# Patient Record
Sex: MALE
[De-identification: names, ages, dates, MRNs, and addresses within clinical notes are randomized per-mention and may not be internally consistent; named-entity substitution may affect disease eponyms.]

---

## 2022-09-26 ENCOUNTER — APPOINTMENT (OUTPATIENT)
Dept: UROLOGY | Facility: CLINIC | Age: 77
End: 2022-09-26

## 2022-09-26 VITALS — RESPIRATION RATE: 16 BRPM | TEMPERATURE: 97.6 F | BODY MASS INDEX: 27.92 KG/M2 | HEIGHT: 70 IN | WEIGHT: 195 LBS

## 2022-09-26 DIAGNOSIS — Z00.00 ENCOUNTER FOR GENERAL ADULT MEDICAL EXAMINATION W/OUT ABNORMAL FINDINGS: ICD-10-CM

## 2022-09-26 DIAGNOSIS — N52.9 MALE ERECTILE DYSFUNCTION, UNSPECIFIED: ICD-10-CM

## 2022-09-26 DIAGNOSIS — R39.9 UNSPECIFIED SYMPTOMS AND SIGNS INVOLVING THE GENITOURINARY SYSTEM: ICD-10-CM

## 2022-09-26 LAB
BILIRUB UR QL STRIP: NORMAL
COLLECTION METHOD: NORMAL
GLUCOSE UR-MCNC: NORMAL
HCG UR QL: 0.2 EU/DL
HGB UR QL STRIP.AUTO: NORMAL
KETONES UR-MCNC: NORMAL
LEUKOCYTE ESTERASE UR QL STRIP: NORMAL
NITRITE UR QL STRIP: NORMAL
PH UR STRIP: 5.5
PROT UR STRIP-MCNC: >=300
SP GR UR STRIP: 1.02

## 2022-09-26 PROCEDURE — 99203 OFFICE O/P NEW LOW 30 MIN: CPT

## 2022-09-26 NOTE — HISTORY OF PRESENT ILLNESS
[FreeTextEntry1] : 77-year-old had a insertion of inflatable penile prosthesis by Dr. Zayas in [facility].  He complains of a lump in the left side of his lower abdomen and has difficulty deflating and inflating the prosthesis.  He only gets it semirigid.\par \par He has nocturia x4, good stream without straining, urgency but denies daytime frequency.  Occasional intermittency.  He is not bothered by his urinary symptoms.  He states PSAs are done regularly and have been normal but I do not have the report to review.

## 2022-09-26 NOTE — ASSESSMENT
[FreeTextEntry1] : Patient with penile prosthesis not able to fully inflate.  Reservoir superficial to rectus.  Patient was referred for evaluation.  I recommend he see men's health specialist Dr. Graham for further evaluation\par \par Patient not bothered by his urinary symptoms [Urinary Symptom or Sign (788.99\R39.89)] : implantation

## 2022-09-26 NOTE — PHYSICAL EXAM
[General Appearance - In No Acute Distress] : no acute distress [] : no respiratory distress [FreeTextEntry1] : Penile prosthesis.  Pump present in the right hemiscrotum.  Attempted cycling only could get partial erection.  Able to deflate.  [place] in area left lower abdomen superficial to rectus muscle [Normal Station and Gait] : the gait and station were normal for the patient's age [Oriented To Time, Place, And Person] : oriented to person, place, and time

## 2022-09-30 ENCOUNTER — APPOINTMENT (OUTPATIENT)
Dept: UROLOGY | Facility: CLINIC | Age: 77
End: 2022-09-30